# Patient Record
Sex: FEMALE | Race: BLACK OR AFRICAN AMERICAN | NOT HISPANIC OR LATINO | Employment: FULL TIME | URBAN - METROPOLITAN AREA
[De-identification: names, ages, dates, MRNs, and addresses within clinical notes are randomized per-mention and may not be internally consistent; named-entity substitution may affect disease eponyms.]

---

## 2019-10-10 ENCOUNTER — TELEPHONE (OUTPATIENT)
Dept: PHARMACY | Facility: CLINIC | Age: 29
End: 2019-10-10

## 2019-10-16 NOTE — TELEPHONE ENCOUNTER
DOCUMENTATION ONLY:     Prior Authorization for Humira CF APPROVED from 9/16/19 to 1/14/20.      Case ID# [id number]    Co-Pay: $5.00    Patient Assistance IS NOT required.     Forward to clinical pharmacist for consult & shipment.      MAHAD

## 2019-10-17 ENCOUNTER — TELEPHONE (OUTPATIENT)
Dept: PHARMACY | Facility: CLINIC | Age: 29
End: 2019-10-17

## 2019-10-17 ENCOUNTER — PATIENT MESSAGE (OUTPATIENT)
Dept: PHARMACY | Facility: CLINIC | Age: 29
End: 2019-10-17

## 2019-10-17 NOTE — TELEPHONE ENCOUNTER
Humira CF initial consult / shipment complete on 10/17. Pt is continuing Humira CF, which has been a good medication for her. Her PsA symptoms are definitely better since on Humira. She reports 2/10 pain, mostly in the hips. There is also stiffness in the hands, fingers, and feet, but the hips are her chief complaint. Morning stiffness lasts 30 minutes to 1 hour. Pt credits the combination of MTX and Humira for her lack of PsO. Pt is able to perform all quality of life measures on the DLQI without difficulty. Pt declined injection training because she is experienced on the medication. She also did not want to review side effects, warnings, and drug interactions. Pt was reminded to store the medication in the refrigerator, and continue to rotate injection sites. Pt confirmed shipping of Humira CF on 10/23 for delivery on 10/24. Pt will inject on . Pt address and  verified. $5.00 copay in 004. [facility]. Pt had no further questions or concerns.

## 2019-10-17 NOTE — TELEPHONE ENCOUNTER
Attempted to contact pt for Humira CF intial consult and schedule shipment on 10/17. $5 copay in 004. Sent Elyssafregorihart message to pt, but unable to LVM. Will continue to follow up.

## 2019-11-22 ENCOUNTER — TELEPHONE (OUTPATIENT)
Dept: PHARMACY | Facility: CLINIC | Age: 29
End: 2019-11-22

## 2019-11-25 NOTE — TELEPHONE ENCOUNTER
DOCUMENTATION ONLY:  Patient is locked into [facility] for specialty medications.   [phone] (Phone)  [phone] (Fax)

## 2020-02-27 ENCOUNTER — TELEPHONE (OUTPATIENT)
Dept: PHARMACY | Facility: CLINIC | Age: 30
End: 2020-02-27

## 2020-02-27 NOTE — TELEPHONE ENCOUNTER
Informed Patient  that [facility] received prescription for Humira CF and benefits investigation is required.  [facility] will be back in touch once insurance determination is received.

## 2020-03-08 PROBLEM — L40.50 PSORIATIC ARTHRITIS: Status: ACTIVE | Noted: 2020-03-08

## 2020-06-19 ENCOUNTER — TELEPHONE (OUTPATIENT)
Dept: PHARMACY | Facility: CLINIC | Age: 30
End: 2020-06-19

## 2020-06-19 NOTE — TELEPHONE ENCOUNTER
Pt confirmed shipping of Humira on  to arrive on . Address and  verified. $0 copay in 004. Pt is in need of a new sharps container. Next dose due today, pt will inject on  when she receives her medication. Pt declined consult, experienced to medication. Pt had no further questions or concerns.

## 2020-07-14 ENCOUNTER — TELEPHONE (OUTPATIENT)
Dept: PHARMACY | Facility: CLINIC | Age: 30
End: 2020-07-14

## 2020-07-14 NOTE — TELEPHONE ENCOUNTER
RX outgoing call regarding Humira @ [facility]. Shipping out  for  arrival with patients consent. Copay of $0.00 @ 004. Address and  confirmed. Patient has 0 doses on hand at this time. Patient has not started any new medications, has had no missed doses and no side effects present. Patient is currently taking the medication as directed by doctors instruction. Patient states they do not have any questions or concerns at this time. Patients next injection is scheduled for . No sharps container needed at this time

## 2020-08-11 ENCOUNTER — TELEPHONE (OUTPATIENT)
Dept: PHARMACY | Facility: CLINIC | Age: 30
End: 2020-08-11

## 2020-09-11 ENCOUNTER — TELEPHONE (OUTPATIENT)
Dept: PHARMACY | Facility: CLINIC | Age: 30
End: 2020-09-11

## 2020-10-06 ENCOUNTER — TELEPHONE (OUTPATIENT)
Dept: PHARMACY | Facility: CLINIC | Age: 30
End: 2020-10-06

## 2020-10-06 ENCOUNTER — PATIENT MESSAGE (OUTPATIENT)
Dept: PHARMACY | Facility: CLINIC | Age: 30
End: 2020-10-06

## 2020-11-07 ENCOUNTER — SPECIALTY PHARMACY (OUTPATIENT)
Dept: PHARMACY | Facility: CLINIC | Age: 30
End: 2020-11-07

## 2020-11-07 NOTE — TELEPHONE ENCOUNTER
11/7 incoming call to confirm shipment for Humira CF, on 11/9 to arrive on 11/10 for injection on 11/10. [facility] - Refill Coordination        Refill Questions - Documented Responses      Most Recent Value   Relationship to patient of person spoken to?  Self   HIPAA/medical authority confirmed?  Yes   Any changes in contact preferences or allowed representatives?  No   Has the patient had any insurance changes?  No   Has the patient had any changes to specialty medication, dose, or instructions?  No   Has the patient started taking any new medications, herbals, or supplements?  No   Has the patient been diagnosed with any new medical conditions?  No   Does the patient have any new allergies to medications or foods?  No   Does the patient have any concerns about side effects?  No   Can the patient store medication/sharps container properly (at the correct temperature, away from children/pets, etc.)?  Yes   Can the patient call emergency services (911) in the event of an emergency?  Yes   Does the patient have any concerns or questions about taking or administering this medication as prescribed?  No   How many doses did the patient miss in the past 4 weeks or since the last fill?  0   How many doses does the patient have on hand?  0   How many days does the patient report on hand quantity will last?  0   Does the number of doses/days supply remaining match pharmacy expected amounts?  Yes   How will the patient receive the medication?  Mail   When does the patient need to receive the medication?  11/09/20   Shipping Address  Home   Address in [facility] confirmed and updated if neccessary?  Yes   Expected Copay ($)  0   Is the patient able to afford the medication copay?  Yes   Payment Method  zero copay   Days supply of Refill  28   Would patient like to speak to a pharmacist?  No   Do you want to trigger an intervention?  No   Do you want to trigger an additional referral task?  No   Refill  activity completed?  Yes   Refill activity plan  Refill scheduled   Shipment/Pickup Date:  11/09/20          Current Outpatient Medications   Medication Sig    adalimumab (HUMIRA,CF, PEN) 40 mg/0.4 mL PnKt Inject 0.4 mLs (40 mg total) into the skin every 14 (fourteen) days.    DENTA 5000 PLUS 1.1 % Crea BRUSH DAILY AT BEDTIME    folic acid (FOLVITE) 1 MG tablet Take 1 tablet (1,000 mcg total) by mouth once daily.    methotrexate 2.5 MG Tab Take 4 tablets (10 mg total) by mouth once a week.   Last reviewed on 11/7/2020  1:24 PM by Elham Cobb    Review of patient's allergies indicates:  No Known Allergies Last reviewed on  11/7/2020 1:24 PM by Elham Cobb      Tasks added this encounter   No tasks added.   Tasks due within next 3 months   No tasks due.     Elham Cobb  [facility]  [address] [place] [id number]  Phone: [phone]  Fax: [phone]    11/7 incoming call to confirm shipment for Humira CF, on 11/9 to arrive on 11/10 for injection on 11/10. RADHA

## 2020-12-10 ENCOUNTER — SPECIALTY PHARMACY (OUTPATIENT)
Dept: PHARMACY | Facility: CLINIC | Age: 30
End: 2020-12-10

## 2020-12-10 NOTE — TELEPHONE ENCOUNTER
Specialty Pharmacy - Refill Coordination    Specialty Medication Orders Linked to Encounter      Most Recent Value   Medication #1  adalimumab (HUMIRA,CF, PEN) 40 mg/0.4 mL PnKt (Order#[id number], Rx#[id number])          Refill Questions - Documented Responses      Most Recent Value   Relationship to patient of person spoken to?  Self   HIPAA/medical authority confirmed?  Yes   Any changes in contact preferences or allowed representatives?  No   Has the patient had any insurance changes?  No   Has the patient had any changes to specialty medication, dose, or instructions?  No   Has the patient started taking any new medications, herbals, or supplements?  No   Has the patient been diagnosed with any new medical conditions?  No   Does the patient have any new allergies to medications or foods?  No   Does the patient have any concerns about side effects?  No   Can the patient store medication/sharps container properly (at the correct temperature, away from children/pets, etc.)?  Yes   Can the patient call emergency services (911) in the event of an emergency?  Yes   Does the patient have any concerns or questions about taking or administering this medication as prescribed?  No   How many doses did the patient miss in the past 4 weeks or since the last fill?  0   How many doses does the patient have on hand?  0   How many days does the patient report on hand quantity will last?  0   Does the number of doses/days supply remaining match pharmacy expected amounts?  Yes   Does the patient feel that this medication is effective?  Yes   During the past 4 weeks, has patient missed any activities due to condition or medication?  No   During the past 4 weeks, did patient have any of the following urgent care visits?  None   How will the patient receive the medication?  Mail   When does the patient need to receive the medication?  12/15/20   Shipping Address  Home   Address in [facility] confirmed and updated if  neccessary?  Yes   Expected Copay ($)  0   Is the patient able to afford the medication copay?  Yes   Payment Method  zero copay   Days supply of Refill  28   Would patient like to speak to a pharmacist?  No   Do you want to trigger an intervention?  No   Do you want to trigger an additional referral task?  No   Refill activity completed?  Yes   Refill activity plan  Refill scheduled   Shipment/Pickup Date:  12/14/20          Current Outpatient Medications   Medication Sig    adalimumab (HUMIRA,CF, PEN) 40 mg/0.4 mL PnKt Inject 0.4 mLs (40 mg total) into the skin every 14 (fourteen) days.    DENTA 5000 PLUS 1.1 % Crea BRUSH DAILY AT BEDTIME    folic acid (FOLVITE) 1 MG tablet Take 1 tablet (1,000 mcg total) by mouth once daily.    methotrexate 2.5 MG Tab Take 4 tablets (10 mg total) by mouth once a week.   Last reviewed on 11/7/2020  1:24 PM by Elham Cobb    Review of patient's allergies indicates:  No Known Allergies Last reviewed on  11/7/2020 1:24 PM by Elham Cobb      Tasks added this encounter   1/2/2021 - Refill Call (Auto Added)   Tasks due within next 3 months   12/12/2020 - Clinical - Follow Up Assesement (90 day)     Alessandra Cote  [facility]  [address] [place] [id number]  Phone: [phone]  Fax: [phone]

## 2021-01-06 ENCOUNTER — SPECIALTY PHARMACY (OUTPATIENT)
Dept: PHARMACY | Facility: CLINIC | Age: 31
End: 2021-01-06

## 2021-05-12 ENCOUNTER — PATIENT MESSAGE (OUTPATIENT)
Dept: RESEARCH | Facility: HOSPITAL | Age: 31
End: 2021-05-12

## 2021-11-08 PROBLEM — Z30.433 ENCOUNTER FOR REMOVAL AND REINSERTION OF INTRAUTERINE CONTRACEPTIVE DEVICE (IUD): Status: ACTIVE | Noted: 2021-11-08

## 2022-01-31 PROBLEM — E66.3 OVERWEIGHT (BMI 25.0-29.9): Status: ACTIVE | Noted: 2022-01-31

## 2022-02-07 PROBLEM — E05.90 SUBCLINICAL HYPERTHYROIDISM: Status: ACTIVE | Noted: 2022-02-07

## 2022-07-25 PROBLEM — R10.13 EPIGASTRIC PAIN: Status: ACTIVE | Noted: 2022-07-25

## 2022-10-14 PROBLEM — M54.9 CHRONIC MIDLINE BACK PAIN: Status: ACTIVE | Noted: 2022-10-14

## 2022-10-14 PROBLEM — G89.29 CHRONIC MIDLINE BACK PAIN: Status: ACTIVE | Noted: 2022-10-14

## 2023-02-22 PROBLEM — F43.22 ADJUSTMENT DISORDER WITH ANXIETY: Status: ACTIVE | Noted: 2023-02-22

## 2023-08-25 PROBLEM — L50.9 URTICARIA: Status: ACTIVE | Noted: 2023-08-25

## 2023-11-03 PROBLEM — M45.8 ANKYLOSING SPONDYLITIS OF SACRAL REGION: Status: ACTIVE | Noted: 2023-11-03

## 2023-11-03 PROBLEM — L40.50 PSORIATIC ARTHRITIS: Status: RESOLVED | Noted: 2020-03-08 | Resolved: 2023-11-03

## 2024-01-05 PROBLEM — T78.1XXA POLLEN-FOOD ALLERGY: Status: ACTIVE | Noted: 2024-01-05

## 2024-01-05 PROBLEM — J31.0 CHRONIC RHINITIS: Status: ACTIVE | Noted: 2024-01-05

## 2024-03-17 PROBLEM — J30.1 SEASONAL ALLERGIC RHINITIS DUE TO POLLEN: Status: ACTIVE | Noted: 2024-03-17

## 2024-03-17 PROBLEM — L50.8 CHRONIC AUTOIMMUNE URTICARIA: Status: ACTIVE | Noted: 2024-03-17

## 2024-11-08 PROBLEM — L50.8 CHRONIC URTICARIA: Status: ACTIVE | Noted: 2023-08-25

## 2025-02-16 PROBLEM — E04.1 THYROID NODULE: Status: ACTIVE | Noted: 2025-02-16

## 2025-04-21 PROBLEM — L50.8 AUTOIMMUNE URTICARIA: Status: ACTIVE | Noted: 2025-04-21

## 2025-04-21 PROBLEM — M45.8 ANKYLOSING SPONDYLITIS OF SACRAL REGION: Status: RESOLVED | Noted: 2023-11-03 | Resolved: 2025-04-21

## 2025-06-06 PROBLEM — M45.9 ANKYLOSING SPONDYLITIS: Status: ACTIVE | Noted: 2023-11-03

## 2025-08-14 ENCOUNTER — PATIENT MESSAGE (OUTPATIENT)
Dept: ADMINISTRATIVE | Facility: OTHER | Age: 35
End: 2025-08-14